# Patient Record
Sex: MALE | Race: WHITE | NOT HISPANIC OR LATINO | URBAN - METROPOLITAN AREA
[De-identification: names, ages, dates, MRNs, and addresses within clinical notes are randomized per-mention and may not be internally consistent; named-entity substitution may affect disease eponyms.]

---

## 2020-07-25 ENCOUNTER — TELEPHONE ENCOUNTER (OUTPATIENT)
Dept: URBAN - METROPOLITAN AREA CLINIC 13 | Facility: CLINIC | Age: 40
End: 2020-07-25

## 2020-07-26 ENCOUNTER — TELEPHONE ENCOUNTER (OUTPATIENT)
Dept: URBAN - METROPOLITAN AREA CLINIC 13 | Facility: CLINIC | Age: 40
End: 2020-07-26

## 2020-07-26 RX ORDER — PRAVASTATIN SODIUM 40 MG/1
TAKE 1 TABLET DAILY TABLET ORAL
Refills: 0 | Status: ACTIVE | COMMUNITY

## 2020-07-26 RX ORDER — ERYTHROMYCIN 5 MG/G
APPLY TWICE A DAY OINTMENT OPHTHALMIC
Qty: 4 | Refills: 0 | Status: ACTIVE | COMMUNITY
Start: 2011-05-04

## 2020-07-26 RX ORDER — CLOBETASOL PROPIONATE 0.5 MG/G
APPLY TO AREA THREE TIMES A DAY OINTMENT TOPICAL
Qty: 15 | Refills: 0 | Status: ACTIVE | COMMUNITY
Start: 2011-05-09

## 2020-07-26 RX ORDER — NAPROXEN 500 MG/1
TAKE 1 TABLET BY MOUTH TWICE A DAY WITH FOOD AS NEEDED FOR PAIN TABLET ORAL
Qty: 60 | Refills: 0 | Status: ACTIVE | COMMUNITY
Start: 2011-08-22

## 2020-07-26 RX ORDER — OXYCODONE AND ACETAMINOPHEN 5; 325 MG/1; MG/1
TAKE 1 TO 2 TABLETS BY MOUTH EVERY 6 HOURS AS NEEDED FOR PAIN TABLET ORAL
Qty: 30 | Refills: 0 | Status: ACTIVE | COMMUNITY
Start: 2012-04-15

## 2020-07-26 RX ORDER — MUPIROCIN 20 MG/G
APPLY 3 TIMES A DAY OINTMENT TOPICAL
Qty: 22 | Refills: 0 | Status: ACTIVE | COMMUNITY
Start: 2011-05-04

## 2020-07-26 RX ORDER — HYDROCODONE BITARTRATE AND ACETAMINOPHEN 10; 325 MG/1; MG/1
TAKE 1 TABLET BY MOUTH EVERY 4 HOURS AS NEEDED FOR SEVERE PAIN TABLET ORAL
Qty: 3 | Refills: 0 | Status: ACTIVE | COMMUNITY
Start: 2011-08-19

## 2020-07-26 RX ORDER — ROSUVASTATIN CALCIUM 20 MG
TAKE 1 TABLET BY MOUTH EVERY DAY TABLET ORAL
Qty: 30 | Refills: 0 | Status: ACTIVE | COMMUNITY
Start: 2011-08-30

## 2020-07-26 RX ORDER — RIFAMPIN 300 MG/1
TAKE ONE CAPSULE BY MOUTH 3 TIMES A DAY FOR 10 DAYS CAPSULE ORAL
Qty: 30 | Refills: 0 | Status: ACTIVE | COMMUNITY
Start: 2011-05-09

## 2020-07-26 RX ORDER — INSULIN GLARGINE 100 [IU]/ML
INJECT 50 UNITS SUBQ EVERY DAY AS DIRECTED INJECTION, SOLUTION SUBCUTANEOUS
Qty: 20 | Refills: 0 | Status: ACTIVE | COMMUNITY
Start: 2011-09-01

## 2020-07-26 RX ORDER — LISINOPRIL 10 MG/1
TAKE 1 TABLET BY MOUTH EVERY DAY TABLET ORAL
Qty: 30 | Refills: 0 | Status: ACTIVE | COMMUNITY
Start: 2011-08-30

## 2020-07-26 RX ORDER — METHOCARBAMOL 500 MG/1
TAKE 1 TABLET BY MOUTH 3 TIMES A DAY AS NEEDED FOR MUSCLE SPASMS TABLET, FILM COATED ORAL
Qty: 90 | Refills: 0 | Status: ACTIVE | COMMUNITY
Start: 2011-08-01

## 2020-07-26 RX ORDER — TRAMADOL HYDROCHLORIDE AND ACETAMINOPHEN 37.5; 325 MG/1; MG/1
TAKE 1 TABLET BY MOUTH EVERY 4 HOURS AS NEEDED FOR PAIN TABLET, FILM COATED ORAL
Qty: 3 | Refills: 0 | Status: ACTIVE | COMMUNITY
Start: 2011-08-19

## 2020-07-26 RX ORDER — INSULIN DETEMIR 100 [IU]/ML
INJECTION, SOLUTION SUBCUTANEOUS
Qty: 20 | Refills: 0 | Status: ACTIVE | COMMUNITY
Start: 2012-06-18

## 2020-07-26 RX ORDER — FENOFIBRATE 160 MG/1
TABLET ORAL
Qty: 30 | Refills: 0 | Status: ACTIVE | COMMUNITY
Start: 2012-04-29

## 2024-12-13 ENCOUNTER — OFFICE VISIT (OUTPATIENT)
Dept: URBAN - METROPOLITAN AREA CLINIC 107 | Facility: CLINIC | Age: 44
End: 2024-12-13
Payer: COMMERCIAL

## 2024-12-13 ENCOUNTER — DASHBOARD ENCOUNTERS (OUTPATIENT)
Age: 44
End: 2024-12-13

## 2024-12-13 ENCOUNTER — LAB OUTSIDE AN ENCOUNTER (OUTPATIENT)
Dept: URBAN - METROPOLITAN AREA CLINIC 107 | Facility: CLINIC | Age: 44
End: 2024-12-13

## 2024-12-13 VITALS
DIASTOLIC BLOOD PRESSURE: 89 MMHG | TEMPERATURE: 97.1 F | SYSTOLIC BLOOD PRESSURE: 125 MMHG | BODY MASS INDEX: 24.36 KG/M2 | WEIGHT: 174 LBS | HEART RATE: 106 BPM | RESPIRATION RATE: 18 BRPM | OXYGEN SATURATION: 97 % | HEIGHT: 71 IN

## 2024-12-13 DIAGNOSIS — R19.7 DIARRHEA, UNSPECIFIED TYPE: ICD-10-CM

## 2024-12-13 DIAGNOSIS — K21.9 GASTROESOPHAGEAL REFLUX DISEASE, UNSPECIFIED WHETHER ESOPHAGITIS PRESENT: ICD-10-CM

## 2024-12-13 DIAGNOSIS — K62.5 BRIGHT RED BLOOD PER RECTUM: ICD-10-CM

## 2024-12-13 DIAGNOSIS — R19.4 ALTERED BOWEL HABITS: ICD-10-CM

## 2024-12-13 PROBLEM — 235595009: Status: ACTIVE | Noted: 2024-12-13

## 2024-12-13 PROCEDURE — 99204 OFFICE O/P NEW MOD 45 MIN: CPT | Performed by: NURSE PRACTITIONER

## 2024-12-13 RX ORDER — TADALAFIL 10 MG/1
1 TABLET AS NEEDED TABLET, FILM COATED ORAL ONCE A DAY
Status: ACTIVE | COMMUNITY

## 2024-12-13 RX ORDER — ATORVASTATIN CALCIUM 80 MG/1
1 TABLET TABLET, FILM COATED ORAL ONCE A DAY
Status: ACTIVE | COMMUNITY

## 2024-12-13 RX ORDER — MELOXICAM 10 MG/1
1 CAPSULE CAPSULE ORAL ONCE A DAY
Status: ACTIVE | COMMUNITY

## 2024-12-13 RX ORDER — ROSUVASTATIN CALCIUM 20 MG
TAKE 1 TABLET BY MOUTH EVERY DAY TABLET ORAL
Qty: 30 | Refills: 0 | Status: DISCONTINUED | COMMUNITY
Start: 2011-08-30

## 2024-12-13 RX ORDER — FAMOTIDINE 40 MG/1
1 TABLET 30 MINUTES BEFORE BREAKFAST AND DINNER TABLET, FILM COATED ORAL TWICE A DAY
Qty: 180 TABLET | Refills: 1 | OUTPATIENT
Start: 2024-12-13

## 2024-12-13 RX ORDER — NAPROXEN 500 MG/1
TAKE 1 TABLET BY MOUTH TWICE A DAY WITH FOOD AS NEEDED FOR PAIN TABLET ORAL
Qty: 60 | Refills: 0 | Status: DISCONTINUED | COMMUNITY
Start: 2011-08-22

## 2024-12-13 RX ORDER — TRAMADOL HYDROCHLORIDE AND ACETAMINOPHEN 37.5; 325 MG/1; MG/1
TAKE 1 TABLET BY MOUTH EVERY 4 HOURS AS NEEDED FOR PAIN TABLET, FILM COATED ORAL
Qty: 3 | Refills: 0 | Status: DISCONTINUED | COMMUNITY
Start: 2011-08-19

## 2024-12-13 RX ORDER — SODIUM, POTASSIUM,MAG SULFATES 17.5-3.13G
AS DIRECTED SOLUTION, RECONSTITUTED, ORAL ORAL
Qty: 1 KIT | Refills: 0 | OUTPATIENT
Start: 2024-12-13

## 2024-12-13 RX ORDER — RIFAMPIN 300 MG/1
TAKE ONE CAPSULE BY MOUTH 3 TIMES A DAY FOR 10 DAYS CAPSULE ORAL
Qty: 30 | Refills: 0 | Status: DISCONTINUED | COMMUNITY
Start: 2011-05-09

## 2024-12-13 RX ORDER — OXYCODONE AND ACETAMINOPHEN 5; 325 MG/1; MG/1
TAKE 1 TO 2 TABLETS BY MOUTH EVERY 6 HOURS AS NEEDED FOR PAIN TABLET ORAL
Qty: 30 | Refills: 0 | Status: DISCONTINUED | COMMUNITY
Start: 2012-04-15

## 2024-12-13 RX ORDER — DEXLANSOPRAZOLE 60 MG/1
1 CAPSULE 1/2 TO 1 HOUR BEFORE MORNING MEAL CAPSULE, DELAYED RELEASE ORAL ONCE A DAY
Status: ACTIVE | COMMUNITY

## 2024-12-13 RX ORDER — INSULIN DETEMIR 100 [IU]/ML
INJECTION, SOLUTION SUBCUTANEOUS
Qty: 20 | Refills: 0 | Status: DISCONTINUED | COMMUNITY
Start: 2012-06-18

## 2024-12-13 RX ORDER — ERYTHROMYCIN 5 MG/G
APPLY TWICE A DAY OINTMENT OPHTHALMIC
Qty: 4 | Refills: 0 | Status: DISCONTINUED | COMMUNITY
Start: 2011-05-04

## 2024-12-13 RX ORDER — METHOCARBAMOL 500 MG/1
TAKE 1 TABLET BY MOUTH 3 TIMES A DAY AS NEEDED FOR MUSCLE SPASMS TABLET, FILM COATED ORAL
Qty: 90 | Refills: 0 | Status: DISCONTINUED | COMMUNITY
Start: 2011-08-01

## 2024-12-13 RX ORDER — INSULIN LISPRO 100 [IU]/ML
AS DIRECTED INJECTION, SOLUTION INTRAVENOUS; SUBCUTANEOUS
Status: ACTIVE | COMMUNITY

## 2024-12-13 RX ORDER — HYDROCODONE BITARTRATE AND ACETAMINOPHEN 10; 325 MG/1; MG/1
TAKE 1 TABLET BY MOUTH EVERY 4 HOURS AS NEEDED FOR SEVERE PAIN TABLET ORAL
Qty: 3 | Refills: 0 | Status: DISCONTINUED | COMMUNITY
Start: 2011-08-19

## 2024-12-13 RX ORDER — FENOFIBRATE 160 MG/1
TABLET ORAL
Qty: 30 | Refills: 0 | Status: DISCONTINUED | COMMUNITY
Start: 2012-04-29

## 2024-12-13 RX ORDER — MUPIROCIN 20 MG/G
APPLY 3 TIMES A DAY OINTMENT TOPICAL
Qty: 22 | Refills: 0 | Status: DISCONTINUED | COMMUNITY
Start: 2011-05-04

## 2024-12-13 RX ORDER — CLOBETASOL PROPIONATE 0.5 MG/G
APPLY TO AREA THREE TIMES A DAY OINTMENT TOPICAL
Qty: 15 | Refills: 0 | Status: DISCONTINUED | COMMUNITY
Start: 2011-05-09

## 2024-12-13 RX ORDER — INSULIN GLARGINE 100 [IU]/ML
INJECT 50 UNITS SUBQ EVERY DAY AS DIRECTED INJECTION, SOLUTION SUBCUTANEOUS
Qty: 20 | Refills: 0 | Status: DISCONTINUED | COMMUNITY
Start: 2011-09-01

## 2024-12-13 RX ORDER — BUPROPION HYDROCHLORIDE 150 MG/1
1 TABLET IN THE MORNING TABLET, FILM COATED ORAL ONCE A DAY
Status: ACTIVE | COMMUNITY

## 2024-12-13 RX ORDER — LORATADINE 10 MG
1 TABLET TABLET ORAL ONCE A DAY
Status: ACTIVE | COMMUNITY

## 2024-12-13 RX ORDER — ESZOPICLONE 3 MG/1
1 TABLET IMMEDIATELY BEFORE BEDTIME TABLET, COATED ORAL ONCE A DAY
Status: ACTIVE | COMMUNITY

## 2024-12-13 RX ORDER — PRAVASTATIN SODIUM 40 MG/1
TAKE 1 TABLET DAILY TABLET ORAL
Refills: 0 | Status: DISCONTINUED | COMMUNITY

## 2024-12-13 RX ORDER — LISINOPRIL 10 MG/1
TAKE 1 TABLET BY MOUTH EVERY DAY TABLET ORAL
Qty: 30 | Refills: 0 | Status: DISCONTINUED | COMMUNITY
Start: 2011-08-30

## 2024-12-13 NOTE — HPI-TODAY'S VISIT:
44-year-old male type 1 diabetes mellitus, GERD, here for blood in stool. Past medical history of mild intermittent asthma, history of tobacco use and history of alcohol abuse. Denies current use. Gastric sleeve 2014.  Father had cirrhosis but drank and had hepatitis  Previous patient of [facility] [place] Dr. Tan. 2010 it was noted had ERCP with manometry with Dr. Gage at [facility] [place] which [place] pancreatic sphincter hypertension and chronic pancreatitis underwent pancreatic sphincterectomy.  Labs 7/11/2024.  Hemoglobin A1c 8.5.  CMP: Glucose 117.  CBC normal with Hgb 13.1.  Lipid panel: Triglycerides normal.  On Dexilant for GERD.  Was provided with samples of Vo[place] but took it 2 days, and it made him sick.    Blood in stool starting 5 weeks ago.  Has mucous and has some red matter in stool.  Has 2-4 BM's a day. No cramps, has urgency.    He reports he went to [facility] urgent care in [place] for some chest pressure and dizziness with activity-had normal EKG an x-ray. Was told to go to the ER or see a cardiologist.  Stopped meloxicam last week.  Rectum is sore from wiping, doesn't feel any hemorrhoid.  He has not had any problems with his pancreas since 2010.  He is on a vegetarian diet.   No prior colonoscopy. No family history of colon cancer, inflammatory bowel disease.

## 2024-12-16 ENCOUNTER — TELEPHONE ENCOUNTER (OUTPATIENT)
Dept: URBAN - METROPOLITAN AREA CLINIC 107 | Facility: CLINIC | Age: 44
End: 2024-12-16

## 2024-12-20 ENCOUNTER — TELEPHONE ENCOUNTER (OUTPATIENT)
Dept: URBAN - METROPOLITAN AREA CLINIC 107 | Facility: CLINIC | Age: 44
End: 2024-12-20